# Patient Record
Sex: FEMALE | Race: WHITE | Employment: OTHER | URBAN - METROPOLITAN AREA
[De-identification: names, ages, dates, MRNs, and addresses within clinical notes are randomized per-mention and may not be internally consistent; named-entity substitution may affect disease eponyms.]

---

## 2021-08-05 ENCOUNTER — APPOINTMENT (OUTPATIENT)
Dept: CT IMAGING | Facility: CLINIC | Age: 61
End: 2021-08-05
Attending: EMERGENCY MEDICINE
Payer: COMMERCIAL

## 2021-08-05 ENCOUNTER — HOSPITAL ENCOUNTER (EMERGENCY)
Facility: CLINIC | Age: 61
Discharge: HOME | End: 2021-08-05
Attending: EMERGENCY MEDICINE | Admitting: EMERGENCY MEDICINE
Payer: COMMERCIAL

## 2021-08-05 VITALS
RESPIRATION RATE: 18 BRPM | OXYGEN SATURATION: 95 % | DIASTOLIC BLOOD PRESSURE: 70 MMHG | TEMPERATURE: 98.3 F | SYSTOLIC BLOOD PRESSURE: 117 MMHG | HEART RATE: 82 BPM

## 2021-08-05 DIAGNOSIS — K57.32 DIVERTICULITIS OF COLON: ICD-10-CM

## 2021-08-05 LAB
ANION GAP SERPL CALCULATED.3IONS-SCNC: 9 MMOL/L (ref 3–14)
BASOPHILS # BLD AUTO: 0 10E3/UL (ref 0–0.2)
BASOPHILS NFR BLD AUTO: 0 %
BUN SERPL-MCNC: 10 MG/DL (ref 7–30)
CALCIUM SERPL-MCNC: 9.5 MG/DL (ref 8.5–10.1)
CHLORIDE BLD-SCNC: 100 MMOL/L (ref 94–109)
CO2 SERPL-SCNC: 25 MMOL/L (ref 20–32)
CREAT BLD-MCNC: 1.1 MG/DL (ref 0.5–1)
CREAT SERPL-MCNC: 1.08 MG/DL (ref 0.52–1.04)
EOSINOPHIL # BLD AUTO: 0 10E3/UL (ref 0–0.7)
EOSINOPHIL NFR BLD AUTO: 0 %
ERYTHROCYTE [DISTWIDTH] IN BLOOD BY AUTOMATED COUNT: 14.7 % (ref 10–15)
GFR SERPL CREATININE-BSD FRML MDRD: 54 ML/MIN/1.73M2
GFR SERPL CREATININE-BSD FRML MDRD: 55 ML/MIN/1.73M2
GLUCOSE BLD-MCNC: 119 MG/DL (ref 70–99)
HCT VFR BLD AUTO: 33.3 % (ref 35–47)
HGB BLD-MCNC: 10.5 G/DL (ref 11.7–15.7)
HOLD SPECIMEN: NORMAL
HOLD SPECIMEN: NORMAL
IMM GRANULOCYTES # BLD: 0.1 10E3/UL
IMM GRANULOCYTES NFR BLD: 0 %
LACTATE SERPL-SCNC: 1.3 MMOL/L (ref 0.7–2)
LYMPHOCYTES # BLD AUTO: 0.9 10E3/UL (ref 0.8–5.3)
LYMPHOCYTES NFR BLD AUTO: 5 %
MCH RBC QN AUTO: 27.2 PG (ref 26.5–33)
MCHC RBC AUTO-ENTMCNC: 31.5 G/DL (ref 31.5–36.5)
MCV RBC AUTO: 86 FL (ref 78–100)
MONOCYTES # BLD AUTO: 1.4 10E3/UL (ref 0–1.3)
MONOCYTES NFR BLD AUTO: 8 %
NEUTROPHILS # BLD AUTO: 14.2 10E3/UL (ref 1.6–8.3)
NEUTROPHILS NFR BLD AUTO: 87 %
NRBC # BLD AUTO: 0 10E3/UL
NRBC BLD AUTO-RTO: 0 /100
PLATELET # BLD AUTO: 540 10E3/UL (ref 150–450)
POTASSIUM BLD-SCNC: 3.4 MMOL/L (ref 3.4–5.3)
RBC # BLD AUTO: 3.86 10E6/UL (ref 3.8–5.2)
SODIUM SERPL-SCNC: 134 MMOL/L (ref 133–144)
WBC # BLD AUTO: 16.6 10E3/UL (ref 4–11)

## 2021-08-05 PROCEDURE — 250N000009 HC RX 250: Performed by: EMERGENCY MEDICINE

## 2021-08-05 PROCEDURE — 250N000011 HC RX IP 250 OP 636: Performed by: EMERGENCY MEDICINE

## 2021-08-05 PROCEDURE — 96360 HYDRATION IV INFUSION INIT: CPT | Mod: 59

## 2021-08-05 PROCEDURE — 85025 COMPLETE CBC W/AUTO DIFF WBC: CPT | Performed by: EMERGENCY MEDICINE

## 2021-08-05 PROCEDURE — 74177 CT ABD & PELVIS W/CONTRAST: CPT

## 2021-08-05 PROCEDURE — 250N000013 HC RX MED GY IP 250 OP 250 PS 637: Performed by: EMERGENCY MEDICINE

## 2021-08-05 PROCEDURE — 83605 ASSAY OF LACTIC ACID: CPT | Performed by: EMERGENCY MEDICINE

## 2021-08-05 PROCEDURE — 96361 HYDRATE IV INFUSION ADD-ON: CPT

## 2021-08-05 PROCEDURE — 258N000003 HC RX IP 258 OP 636: Performed by: EMERGENCY MEDICINE

## 2021-08-05 PROCEDURE — 99285 EMERGENCY DEPT VISIT HI MDM: CPT | Mod: 25

## 2021-08-05 PROCEDURE — 80048 BASIC METABOLIC PNL TOTAL CA: CPT | Performed by: EMERGENCY MEDICINE

## 2021-08-05 PROCEDURE — 36415 COLL VENOUS BLD VENIPUNCTURE: CPT | Performed by: EMERGENCY MEDICINE

## 2021-08-05 PROCEDURE — 82565 ASSAY OF CREATININE: CPT

## 2021-08-05 RX ORDER — ONDANSETRON 4 MG/1
4 TABLET, ORALLY DISINTEGRATING ORAL EVERY 8 HOURS PRN
Qty: 10 TABLET | Refills: 0 | Status: SHIPPED | OUTPATIENT
Start: 2021-08-05 | End: 2021-08-08

## 2021-08-05 RX ORDER — ACETAMINOPHEN 325 MG/1
650 TABLET ORAL ONCE
Status: COMPLETED | OUTPATIENT
Start: 2021-08-05 | End: 2021-08-05

## 2021-08-05 RX ORDER — ONDANSETRON 2 MG/ML
4 INJECTION INTRAMUSCULAR; INTRAVENOUS EVERY 30 MIN PRN
Status: DISCONTINUED | OUTPATIENT
Start: 2021-08-05 | End: 2021-08-05 | Stop reason: HOSPADM

## 2021-08-05 RX ORDER — IOPAMIDOL 755 MG/ML
500 INJECTION, SOLUTION INTRAVASCULAR ONCE
Status: COMPLETED | OUTPATIENT
Start: 2021-08-05 | End: 2021-08-05

## 2021-08-05 RX ORDER — IBUPROFEN 600 MG/1
600 TABLET, FILM COATED ORAL ONCE
Status: COMPLETED | OUTPATIENT
Start: 2021-08-05 | End: 2021-08-05

## 2021-08-05 RX ORDER — OXYCODONE HYDROCHLORIDE 5 MG/1
5 TABLET ORAL EVERY 6 HOURS PRN
Qty: 12 TABLET | Refills: 0 | Status: SHIPPED | OUTPATIENT
Start: 2021-08-05 | End: 2021-08-08

## 2021-08-05 RX ADMIN — IBUPROFEN 600 MG: 600 TABLET ORAL at 11:22

## 2021-08-05 RX ADMIN — SODIUM CHLORIDE 65 ML: 9 INJECTION, SOLUTION INTRAVENOUS at 12:36

## 2021-08-05 RX ADMIN — SODIUM CHLORIDE 1000 ML: 9 INJECTION, SOLUTION INTRAVENOUS at 11:21

## 2021-08-05 RX ADMIN — IOPAMIDOL 100 ML: 755 INJECTION, SOLUTION INTRAVENOUS at 12:35

## 2021-08-05 RX ADMIN — ACETAMINOPHEN 650 MG: 325 TABLET, FILM COATED ORAL at 11:21

## 2021-08-05 ASSESSMENT — ENCOUNTER SYMPTOMS
NAUSEA: 1
DYSURIA: 0
ABDOMINAL PAIN: 1
CHILLS: 1
DIFFICULTY URINATING: 0
BLOOD IN STOOL: 0
VOMITING: 0
APPETITE CHANGE: 1
FEVER: 0

## 2021-08-05 NOTE — ED PROVIDER NOTES
History   Chief Complaint:  Abdominal Pain       The history is provided by the patient.      Monica Martinez is a 61 year old female with history of biliary colic and hypertension who presents with strong, cramping abdominal pain. She reports worse pain on her left side, which has been coming on in the morning for the past two weeks and then resolves throughout the day. However, the pain has been longer-lasting and more consistent today. The patient reports chills and nausea a few nights ago, as well as decreased appetite for the past three days. She notes soft, small stools, but is still having bowel movements. The patient denies fever, vomiting, blood in stool, urinary symptoms, and vaginal bleeding or discharge. She reports history of a kidney stone in 1977 and says that she has had her gallbladder out, but no other abdominal surgeries.     Review of Systems   Constitutional: Positive for appetite change and chills. Negative for fever.   Gastrointestinal: Positive for abdominal pain and nausea. Negative for blood in stool and vomiting.   Genitourinary: Negative for difficulty urinating, dysuria, vaginal bleeding and vaginal discharge.   All other systems reviewed and are negative.      Allergies:  Codeine  Diphenhydramine-Acetaminophen    Medications:  Toprol  Lexapro  Hydrochlorothiazide  Lisinopril  Albuterol  Acular  Vigamox    Past Medical History:    Allergic state  Perforated gallbladder  Biliary colic   Hypertension  Psoriasis  Obesity  Essential tremor  Chronic bronchitis  Insulin resistance  Depression   GERD    Past Surgical History:    Laparoscopic cholecystectomy w cholangiograms   [place] teeth extraction  I&D perianal abscess superficial     Family History:    Muscular dystrophy  Rheum arthritis    Social History:  The patient presents alone. She notes that her daughter is visiting from [place] tomorrow.     Physical Exam     Patient Vitals for the past 24 hrs:   BP Temp Temp src Pulse Resp SpO2    08/05/21 1400 117/70 -- -- 82 -- 95 %   08/05/21 1330 115/68 -- -- 73 -- 95 %   08/05/21 1230 119/68 -- -- 79 -- 96 %   08/05/21 1200 123/66 -- -- 83 -- 97 %   08/05/21 1145 118/65 -- -- -- -- 98 %   08/05/21 1130 -- -- -- 89 -- 98 %   08/05/21 1022 121/78 98.3  F (36.8  C) Oral 103 18 97 %       Physical Exam  General: Patient is awake, alert and interactive when I enter the room. Appears uncomfortable   Head: The scalp, face, and head appear normal  Eyes: The pupils are equal, round, and reactive to light. Conjunctivae and sclerae are normal  ENT: External acoustic canals are normal. The oropharynx is normal without erythema. Uvula is in the midline  Neck: Normal range of motion. No anterior cervical lymphadenopathy noted  CV: Regular rate. S1/S2. No murmurs.   Resp: Lungs are clear without wheezes or rales. No respiratory distress.   GI: Abdomen is soft, no rigidity. No evidence of pulsatile mass. No fluid waves or evidence of ascites. No distension. She is tender to palpation in the LLQ quadrant. There is no rebound or guarding. No hernias or bruising are noted in detailed exam. No CVA tenderness.     MS: Normal tone. Joints grossly normal without effusions. No asymmetric leg swelling, calf or thigh tenderness.    Skin: No rash or lesions noted. Normal capillary refill noted  Neuro: Speech is normal and fluent. Face is symmetric. Moving all extremities.   Psych:  Normal affect.  Appropriate interactions.    Emergency Department Course     Imaging:    CT Abdomen Pelvis w Contrast  1.  Severe acute sigmoid diverticulitis. No discrete abscess and no  free air. Recommend follow-up after resolution of the patient's  symptoms to exclude any potential underlying colonic lesion  As per radiology.       Laboratory:    CBC: WBC 16.6 (H), HGB 10.5 (L),  (H)   BMP: Creatinine 1.08 (H), Glucose 119 (H), GFR 55 (L) o/w WNL      Lactic acid (result time 1150) 1.3   Creatinine POCT: Creatinine 1.1 (H), GFR 54  (L)      Emergency Department Course:    Reviewed:  I reviewed nursing notes, vitals, past medical history and care everywhere    Assessments:  1103 I obtained history and examined the patient as noted above.   1359 I rechecked the patient and explained findings. At this point I feel that the patient is safe for discharge, and the patient agrees.    Interventions:  1121 NS 1 L IV  1121 Tylenol 650 mg Oral  1122 Ibuprofen 600 mg Oral    Disposition:  The patient was discharged to home.       Impression & Plan     Medical Decision Making:  Monica Martinez is a 61 year old female who presents with abdominal pain, nausea and chills. A broad differential diagnosis was considered including urinary obstruction, colitis, appendicitis, intestinal cramping, aortic pathologies, pyelonephritis, ureterolithiasis, UTI, constipation, diverticulitis, obstruction, ileus, volvulus, etc as possibilities. CT confirms diverticulitis without abscess or perforation. Her pain has been controlled by the fluids ibuprofen and Tylenol in the emergency department. On recheck, she has a non-surgical exam, pain is controlled, and she is tolerating POs.  I discussed indications for admission versus discharge and together we opted for outpatient management.  I will prescribe augmentin and supportive medications as per below.  We discussed the natural history of this problem, and I discussed dietary precautions. I recommended she return to the ED for worsening pain, fever, vomiting, bloody or black stools, or for any other concerning symptoms. I recommended she follow up with her primary care physician in 2-3 days. All question were answered and the patient is amenable for discharge at this time.      Diagnosis:    ICD-10-CM    1. Diverticulitis of colon  K57.32        Discharge Medications:  New Prescriptions    AMOXICILLIN-CLAVULANATE (AUGMENTIN) 875-125 MG TABLET    Take 1 tablet by mouth 2 times daily for 7 days    ONDANSETRON (ZOFRAN ODT) 4 MG  ODT TAB    Take 1 tablet (4 mg) by mouth every 8 hours as needed for nausea    OXYCODONE (ROXICODONE) 5 MG TABLET    Take 1 tablet (5 mg) by mouth every 6 hours as needed for pain       Scribe Disclosure:  I, Mimi Aguirrek, am serving as a scribe at 11:03 AM on 8/5/2021 to document services personally performed by Jg Singh MD based on my observations and the provider's statements to me.      Jg Singh MD  08/05/21 3913

## 2021-08-05 NOTE — DISCHARGE INSTRUCTIONS

## 2021-08-05 NOTE — ED TRIAGE NOTES
Pt arrives with c/o 2 weeks of lower abdominal pain, left side greater than right. Pt reports some nausea and vomiting a couple nights ago but nothing recent. Denies urinary symptoms, denies fevers at home. ABCs intact.